# Patient Record
Sex: FEMALE | Race: WHITE | NOT HISPANIC OR LATINO | Employment: UNEMPLOYED | URBAN - METROPOLITAN AREA
[De-identification: names, ages, dates, MRNs, and addresses within clinical notes are randomized per-mention and may not be internally consistent; named-entity substitution may affect disease eponyms.]

---

## 2023-12-07 ENCOUNTER — OFFICE VISIT (OUTPATIENT)
Dept: CARDIOLOGY | Facility: CLINIC | Age: 53
End: 2023-12-07
Payer: COMMERCIAL

## 2023-12-07 VITALS
SYSTOLIC BLOOD PRESSURE: 135 MMHG | BODY MASS INDEX: 18.55 KG/M2 | HEIGHT: 62 IN | DIASTOLIC BLOOD PRESSURE: 73 MMHG | HEART RATE: 75 BPM | WEIGHT: 100.8 LBS

## 2023-12-07 DIAGNOSIS — R94.39 ABNORMAL STRESS ECG WITH TREADMILL: Primary | ICD-10-CM

## 2023-12-07 PROCEDURE — 99203 OFFICE O/P NEW LOW 30 MIN: CPT | Performed by: INTERNAL MEDICINE

## 2023-12-07 PROCEDURE — 1036F TOBACCO NON-USER: CPT | Performed by: INTERNAL MEDICINE

## 2023-12-07 PROCEDURE — 99213 OFFICE O/P EST LOW 20 MIN: CPT | Performed by: INTERNAL MEDICINE

## 2023-12-07 NOTE — PROGRESS NOTES
"Chief Complaint:   Cardiac Stress Test     History of Present Illness     Nelia Levine is a 53 y.o. female presenting with Abnormal GXT.   She had an abnormal GXT performed on 11/22/23 to evaluate an abnormal ECG from July. 2 mm ST-depression no CP.  No cardiac Hx.  She has had no CP or COOK. No Hx HTN or DM.  -140, HDL >100. Non-smoker.       Previous History     Past Medical History:  She has a past medical history of Abnormal stress ECG with treadmill (12/07/2023).    Past Surgical History:  She has a past surgical history that includes Other surgical history (07/09/2020) and Other surgical history (07/09/2020).      Social History:  She reports that she has never smoked. She has never used smokeless tobacco. No history on file for alcohol use and drug use.    Family History:  No family history on file.     Allergies:  Patient has no known allergies.    Outpatient Medications:  No current outpatient medications    Physical Examination   Vitals:  Visit Vitals  /73 (BP Location: Right arm, Patient Position: Sitting, BP Cuff Size: Adult)   Pulse 75   Ht 1.575 m (5' 2\")   Wt 45.7 kg (100 lb 12.8 oz)   BMI 18.44 kg/m²   Smoking Status Never   BSA 1.41 m²    Physical Exam  Vitals reviewed.   Constitutional:       General: She is not in acute distress.     Appearance: Normal appearance.   HENT:      Head: Normocephalic and atraumatic.      Nose: Nose normal.   Eyes:      Conjunctiva/sclera: Conjunctivae normal.   Cardiovascular:      Rate and Rhythm: Normal rate and regular rhythm.      Pulses: Normal pulses.      Heart sounds: No murmur heard.  Pulmonary:      Effort: Pulmonary effort is normal. No respiratory distress.      Breath sounds: Normal breath sounds. No wheezing, rhonchi or rales.   Abdominal:      General: Bowel sounds are normal. There is no distension.      Palpations: Abdomen is soft.      Tenderness: There is no abdominal tenderness.   Musculoskeletal:         General: No " "swelling.      Right lower leg: No edema.      Left lower leg: No edema.   Skin:     General: Skin is warm and dry.      Capillary Refill: Capillary refill takes less than 2 seconds.   Neurological:      General: No focal deficit present.      Mental Status: She is alert.   Psychiatric:         Mood and Affect: Mood normal.              Labs/Imaging/Cardiac Studies     Last Labs:  CBC -  No results found for: \"WBC\", \"HGB\", \"HCT\", \"MCV\", \"PLT\"    CMP -  No results found for: \"CALCIUM\", \"PHOS\", \"PROT\", \"ALBUMIN\", \"AST\", \"ALT\", \"ALKPHOS\", \"BILITOT\"    LIPID PANEL -   No results found for: \"CHOL\", \"HDL\", \"CHHDL\", \"LDL\", \"VLDL\", \"TRIG\", \"NHDL\"    RENAL FUNCTION PANEL -   No results found for: \"GLU\", \"K\", \"CHLOR\", \"PHOS\"    No results found for: \"BNP\", \"HGBA1C\"    ECG:    Echo:  No echocardiogram results found for the past 12 months       Assessment and Recommendations     Assessment/Plan     1. Abnormal stress ECG with treadmill  Suspect this is a false positive stress test.  She has a low pretest probability (no risk factors for CAD other than LDL), baseline abnormal ECG, no symptoms of angina.  Suggest repeat stress test with echo imaging.  - Echocardiogram Stress Test; Future       Edwar Mendes MD    Exclusive of any other services or procedures performed, I, Edwar Mendes MD , spent 30 minutes in duration for this visit today.  This time consisted of chart review, obtaining history, and/or performing the exam as documented above as well as documenting the clinical information for the encounter in the electronic record, discussing treatment options, plans, and/or goals with patient, family, and/or caregiver, refilling medications, updating the electronic record, ordering medicines, lab work, imaging, referrals, and/or procedures as documented above and communicating with other [facility] professionals. I have discussed the results of laboratory, radiology, and cardiology studies with the patient and their " family/caregiver.

## 2024-01-10 ENCOUNTER — TELEPHONE (OUTPATIENT)
Dept: CARDIOLOGY | Facility: CLINIC | Age: 54
End: 2024-01-10
Payer: COMMERCIAL

## 2024-01-10 ENCOUNTER — APPOINTMENT (OUTPATIENT)
Dept: CARDIOLOGY | Facility: CLINIC | Age: 54
End: 2024-01-10
Payer: COMMERCIAL

## 2024-01-16 ENCOUNTER — HOSPITAL ENCOUNTER (OUTPATIENT)
Dept: CARDIOLOGY | Facility: CLINIC | Age: 54
Discharge: HOME | End: 2024-01-16
Payer: COMMERCIAL

## 2024-01-16 VITALS
DIASTOLIC BLOOD PRESSURE: 77 MMHG | SYSTOLIC BLOOD PRESSURE: 157 MMHG | BODY MASS INDEX: 18.4 KG/M2 | WEIGHT: 100 LBS | HEIGHT: 62 IN | HEART RATE: 78 BPM

## 2024-01-16 DIAGNOSIS — R94.39 ABNORMAL STRESS ECG WITH TREADMILL: ICD-10-CM

## 2024-01-16 DIAGNOSIS — R94.31 ABNORMAL ELECTROCARDIOGRAM (ECG) (EKG): ICD-10-CM

## 2024-01-16 PROCEDURE — 93018 CV STRESS TEST I&R ONLY: CPT | Performed by: INTERNAL MEDICINE

## 2024-01-16 PROCEDURE — 93350 STRESS TTE ONLY: CPT | Performed by: INTERNAL MEDICINE

## 2024-01-16 PROCEDURE — 93350 STRESS TTE ONLY: CPT

## 2024-01-16 PROCEDURE — 93016 CV STRESS TEST SUPVJ ONLY: CPT | Performed by: INTERNAL MEDICINE

## 2024-02-01 ENCOUNTER — APPOINTMENT (OUTPATIENT)
Dept: CARDIOLOGY | Facility: CLINIC | Age: 54
End: 2024-02-01
Payer: COMMERCIAL

## 2024-02-01 DIAGNOSIS — E78.2 MIXED HYPERLIPIDEMIA: Primary | ICD-10-CM

## 2024-11-10 ENCOUNTER — HOSPITAL ENCOUNTER (INPATIENT)
Age: 54
LOS: 3 days | Discharge: HOME | DRG: 690 | End: 2024-11-13
Attending: FAMILY MEDICINE | Admitting: INTERNAL MEDICINE
Payer: COMMERCIAL

## 2024-11-10 DIAGNOSIS — N39.0 COMPLICATED UTI (URINARY TRACT INFECTION): Primary | ICD-10-CM

## 2024-11-10 DIAGNOSIS — N12 PYELONEPHRITIS: ICD-10-CM

## 2024-11-10 PROBLEM — D56.9 THALASSEMIA, UNSPECIFIED: Status: ACTIVE | Noted: 2024-11-10

## 2024-11-10 PROBLEM — B96.89 URINARY TRACT INFECTION DUE TO ESBL KLEBSIELLA: Status: RESOLVED | Noted: 2024-11-10 | Resolved: 2024-11-10

## 2024-11-10 PROBLEM — A49.9 INFECTION DUE TO EXTENDED SPECTRUM BETA LACTAMASE (ESBL) PRODUCING BACTERIA: Status: ACTIVE | Noted: 2024-11-10

## 2024-11-10 PROBLEM — Z16.12 INFECTION DUE TO EXTENDED SPECTRUM BETA LACTAMASE (ESBL) PRODUCING BACTERIA: Status: ACTIVE | Noted: 2024-11-10

## 2024-11-10 PROBLEM — B96.89 URINARY TRACT INFECTION DUE TO ESBL KLEBSIELLA: Status: ACTIVE | Noted: 2024-11-10

## 2024-11-10 LAB
BACTERIA URNS QL MICRO: ABNORMAL
BILIRUB UR QL STRIP: NEGATIVE
CASTS #/AREA URNS LPF: ABNORMAL /LPF (ref 0–8)
CLARITY UR: ABNORMAL
COLOR UR: ABNORMAL
CRYSTALS URNS MICRO: ABNORMAL /HPF
EPI CELLS #/AREA URNS HPF: ABNORMAL /HPF (ref 0–5)
GLUCOSE UR STRIP-MCNC: NEGATIVE MG/DL
HGB UR QL STRIP.AUTO: NEGATIVE
KETONES UR STRIP-MCNC: ABNORMAL MG/DL
LEUKOCYTE ESTERASE UR QL STRIP: ABNORMAL
NITRITE UR QL STRIP: NEGATIVE
PH UR STRIP: 5.5 [PH] (ref 5–8)
PROT UR STRIP-MCNC: ABNORMAL MG/DL
RBC #/AREA URNS HPF: ABNORMAL /HPF (ref 0–4)
SP GR UR STRIP: 1.05 (ref 1–1.03)
UROBILINOGEN UR STRIP-ACNC: NORMAL EU/DL (ref 0–1)
WBC #/AREA URNS HPF: ABNORMAL /HPF (ref 0–5)

## 2024-11-10 PROCEDURE — 1200000000 HC SEMI PRIVATE

## 2024-11-10 PROCEDURE — 2580000003 HC RX 258: Performed by: NURSE PRACTITIONER

## 2024-11-10 PROCEDURE — 99222 1ST HOSP IP/OBS MODERATE 55: CPT | Performed by: NURSE PRACTITIONER

## 2024-11-10 PROCEDURE — 6360000002 HC RX W HCPCS: Performed by: NURSE PRACTITIONER

## 2024-11-10 PROCEDURE — 6370000000 HC RX 637 (ALT 250 FOR IP): Performed by: NURSE PRACTITIONER

## 2024-11-10 PROCEDURE — 87086 URINE CULTURE/COLONY COUNT: CPT

## 2024-11-10 PROCEDURE — 81001 URINALYSIS AUTO W/SCOPE: CPT

## 2024-11-10 RX ORDER — ONDANSETRON 2 MG/ML
4 INJECTION INTRAMUSCULAR; INTRAVENOUS EVERY 6 HOURS PRN
Status: DISCONTINUED | OUTPATIENT
Start: 2024-11-10 | End: 2024-11-13 | Stop reason: HOSPADM

## 2024-11-10 RX ORDER — SODIUM CHLORIDE 0.9 % (FLUSH) 0.9 %
5-40 SYRINGE (ML) INJECTION EVERY 12 HOURS SCHEDULED
Status: DISCONTINUED | OUTPATIENT
Start: 2024-11-10 | End: 2024-11-13 | Stop reason: HOSPADM

## 2024-11-10 RX ORDER — ONDANSETRON 4 MG/1
4 TABLET, ORALLY DISINTEGRATING ORAL EVERY 8 HOURS PRN
Status: DISCONTINUED | OUTPATIENT
Start: 2024-11-10 | End: 2024-11-13 | Stop reason: HOSPADM

## 2024-11-10 RX ORDER — SODIUM CHLORIDE 9 MG/ML
INJECTION, SOLUTION INTRAVENOUS CONTINUOUS
Status: DISCONTINUED | OUTPATIENT
Start: 2024-11-10 | End: 2024-11-11

## 2024-11-10 RX ORDER — BISACODYL 10 MG
10 SUPPOSITORY, RECTAL RECTAL DAILY PRN
Status: DISCONTINUED | OUTPATIENT
Start: 2024-11-10 | End: 2024-11-13 | Stop reason: HOSPADM

## 2024-11-10 RX ORDER — ENOXAPARIN SODIUM 100 MG/ML
40 INJECTION SUBCUTANEOUS DAILY
Status: DISCONTINUED | OUTPATIENT
Start: 2024-11-10 | End: 2024-11-11

## 2024-11-10 RX ORDER — MORPHINE SULFATE 2 MG/ML
2 INJECTION, SOLUTION INTRAMUSCULAR; INTRAVENOUS EVERY 4 HOURS PRN
Status: DISCONTINUED | OUTPATIENT
Start: 2024-11-10 | End: 2024-11-13 | Stop reason: HOSPADM

## 2024-11-10 RX ORDER — SODIUM CHLORIDE 9 MG/ML
INJECTION, SOLUTION INTRAVENOUS PRN
Status: DISCONTINUED | OUTPATIENT
Start: 2024-11-10 | End: 2024-11-13 | Stop reason: HOSPADM

## 2024-11-10 RX ORDER — SODIUM CHLORIDE 0.9 % (FLUSH) 0.9 %
5-40 SYRINGE (ML) INJECTION PRN
Status: DISCONTINUED | OUTPATIENT
Start: 2024-11-10 | End: 2024-11-13 | Stop reason: HOSPADM

## 2024-11-10 RX ORDER — KETOROLAC TROMETHAMINE 15 MG/ML
15 INJECTION, SOLUTION INTRAMUSCULAR; INTRAVENOUS EVERY 6 HOURS PRN
Status: DISCONTINUED | OUTPATIENT
Start: 2024-11-10 | End: 2024-11-11

## 2024-11-10 RX ORDER — POTASSIUM CHLORIDE 1500 MG/1
40 TABLET, EXTENDED RELEASE ORAL PRN
Status: DISCONTINUED | OUTPATIENT
Start: 2024-11-10 | End: 2024-11-13 | Stop reason: HOSPADM

## 2024-11-10 RX ORDER — POLYETHYLENE GLYCOL 3350 17 G/17G
17 POWDER, FOR SOLUTION ORAL DAILY PRN
Status: DISCONTINUED | OUTPATIENT
Start: 2024-11-10 | End: 2024-11-13 | Stop reason: HOSPADM

## 2024-11-10 RX ORDER — POTASSIUM CHLORIDE 7.45 MG/ML
10 INJECTION INTRAVENOUS PRN
Status: DISCONTINUED | OUTPATIENT
Start: 2024-11-10 | End: 2024-11-13 | Stop reason: HOSPADM

## 2024-11-10 RX ORDER — ACETAMINOPHEN 325 MG/1
650 TABLET ORAL EVERY 6 HOURS PRN
Status: DISCONTINUED | OUTPATIENT
Start: 2024-11-10 | End: 2024-11-13 | Stop reason: HOSPADM

## 2024-11-10 RX ORDER — ACETAMINOPHEN 650 MG/1
650 SUPPOSITORY RECTAL EVERY 6 HOURS PRN
Status: DISCONTINUED | OUTPATIENT
Start: 2024-11-10 | End: 2024-11-13 | Stop reason: HOSPADM

## 2024-11-10 RX ORDER — LANOLIN ALCOHOL/MO/W.PET/CERES
3 CREAM (GRAM) TOPICAL DAILY PRN
Status: DISCONTINUED | OUTPATIENT
Start: 2024-11-10 | End: 2024-11-13 | Stop reason: HOSPADM

## 2024-11-10 RX ADMIN — SODIUM CHLORIDE: 9 INJECTION, SOLUTION INTRAVENOUS at 22:50

## 2024-11-10 RX ADMIN — ACETAMINOPHEN 650 MG: 325 TABLET ORAL at 22:52

## 2024-11-10 RX ADMIN — MEROPENEM 1000 MG: 1 INJECTION INTRAVENOUS at 22:51

## 2024-11-10 RX ADMIN — SODIUM CHLORIDE, PRESERVATIVE FREE 10 ML: 5 INJECTION INTRAVENOUS at 22:51

## 2024-11-10 ASSESSMENT — PAIN DESCRIPTION - LOCATION
LOCATION: ABDOMEN
LOCATION: ABDOMEN

## 2024-11-10 ASSESSMENT — PAIN DESCRIPTION - DESCRIPTORS: DESCRIPTORS: ACHING

## 2024-11-10 ASSESSMENT — PAIN SCALES - GENERAL
PAINLEVEL_OUTOF10: 3
PAINLEVEL_OUTOF10: 0
PAINLEVEL_OUTOF10: 3

## 2024-11-11 ENCOUNTER — APPOINTMENT (OUTPATIENT)
Dept: CT IMAGING | Age: 54
DRG: 690 | End: 2024-11-11
Attending: FAMILY MEDICINE
Payer: COMMERCIAL

## 2024-11-11 PROBLEM — Z16.12 INFECTION DUE TO ESBL-PRODUCING ESCHERICHIA COLI: Status: ACTIVE | Noted: 2024-11-11

## 2024-11-11 PROBLEM — R65.10 SIRS (SYSTEMIC INFLAMMATORY RESPONSE SYNDROME) (HCC): Status: ACTIVE | Noted: 2024-11-11

## 2024-11-11 PROBLEM — N39.0 COMPLICATED UTI (URINARY TRACT INFECTION): Status: ACTIVE | Noted: 2024-11-11

## 2024-11-11 PROBLEM — A49.8 INFECTION DUE TO ESBL-PRODUCING ESCHERICHIA COLI: Status: ACTIVE | Noted: 2024-11-11

## 2024-11-11 LAB
ABO + RH BLD: NORMAL
ALBUMIN SERPL-MCNC: 3.2 G/DL (ref 3.5–5.2)
ALBUMIN/GLOB SERPL: 1.4 {RATIO} (ref 1–2.5)
ALP SERPL-CCNC: 170 U/L (ref 35–104)
ALT SERPL-CCNC: 42 U/L (ref 10–35)
ANION GAP SERPL CALCULATED.3IONS-SCNC: 11 MMOL/L (ref 9–16)
ARM BAND NUMBER: NORMAL
AST SERPL-CCNC: 42 U/L (ref 10–35)
BASOPHILS # BLD: 0 K/UL (ref 0–0.2)
BASOPHILS NFR BLD: 0 % (ref 0–2)
BILIRUB SERPL-MCNC: 0.9 MG/DL (ref 0–1.2)
BLOOD BANK SAMPLE EXPIRATION: NORMAL
BLOOD GROUP ANTIBODIES SERPL: NEGATIVE
BUN SERPL-MCNC: 7 MG/DL (ref 6–20)
CALCIUM SERPL-MCNC: 8.1 MG/DL (ref 8.6–10.4)
CHLORIDE SERPL-SCNC: 102 MMOL/L (ref 98–107)
CO2 SERPL-SCNC: 22 MMOL/L (ref 20–31)
CREAT SERPL-MCNC: 0.7 MG/DL (ref 0.6–0.9)
EOSINOPHIL # BLD: 0 K/UL (ref 0–0.44)
EOSINOPHILS RELATIVE PERCENT: 0 % (ref 1–4)
ERYTHROCYTE [DISTWIDTH] IN BLOOD BY AUTOMATED COUNT: 14.6 % (ref 11.8–14.4)
GFR, ESTIMATED: >90 ML/MIN/1.73M2
GLUCOSE SERPL-MCNC: 103 MG/DL (ref 74–99)
HCT VFR BLD AUTO: 34 % (ref 36.3–47.1)
HGB BLD-MCNC: 10.8 G/DL (ref 11.9–15.1)
IMM GRANULOCYTES # BLD AUTO: 0.13 K/UL (ref 0–0.3)
IMM GRANULOCYTES NFR BLD: 1 %
IMM RETICS NFR: 11.7 % (ref 2.7–18.3)
LACTIC ACID, SEPSIS WHOLE BLOOD: 0.7 MMOL/L (ref 0.5–1.9)
LACTIC ACID, SEPSIS WHOLE BLOOD: 0.9 MMOL/L (ref 0.5–1.9)
LACTIC ACID, WHOLE BLOOD: 1.3 MMOL/L (ref 0.7–2.1)
LDH SERPL-CCNC: 156 U/L (ref 135–214)
LYMPHOCYTES NFR BLD: 0.67 K/UL (ref 1.1–3.7)
LYMPHOCYTES RELATIVE PERCENT: 5 % (ref 24–43)
MAGNESIUM SERPL-MCNC: 1.9 MG/DL (ref 1.6–2.6)
MCH RBC QN AUTO: 22.5 PG (ref 25.2–33.5)
MCHC RBC AUTO-ENTMCNC: 31.8 G/DL (ref 28.4–34.8)
MCV RBC AUTO: 70.8 FL (ref 82.6–102.9)
MICROORGANISM SPEC CULT: NO GROWTH
MONOCYTES NFR BLD: 0.8 K/UL (ref 0.1–1.2)
MONOCYTES NFR BLD: 6 % (ref 3–12)
MORPHOLOGY: ABNORMAL
MORPHOLOGY: ABNORMAL
NEUTROPHILS NFR BLD: 88 % (ref 36–65)
NEUTS SEG NFR BLD: 11.8 K/UL (ref 1.5–8.1)
NRBC BLD-RTO: 0 PER 100 WBC
PLATELET # BLD AUTO: 240 K/UL (ref 138–453)
PMV BLD AUTO: 10.3 FL (ref 8.1–13.5)
POTASSIUM SERPL-SCNC: 3.7 MMOL/L (ref 3.7–5.3)
PROT SERPL-MCNC: 5.5 G/DL (ref 6.6–8.7)
RBC # BLD AUTO: 4.8 M/UL (ref 3.95–5.11)
RETIC HEMOGLOBIN: 20.8 PG (ref 28.2–35.7)
RETICS # AUTO: 0.03 M/UL (ref 0.03–0.08)
RETICS/RBC NFR AUTO: 0.7 % (ref 0.5–1.9)
SERVICE CMNT-IMP: NORMAL
SODIUM SERPL-SCNC: 135 MMOL/L (ref 136–145)
SPECIMEN DESCRIPTION: NORMAL
WBC OTHER # BLD: 13.4 K/UL (ref 3.5–11.3)

## 2024-11-11 PROCEDURE — 74176 CT ABD & PELVIS W/O CONTRAST: CPT

## 2024-11-11 PROCEDURE — 86850 RBC ANTIBODY SCREEN: CPT

## 2024-11-11 PROCEDURE — 2580000003 HC RX 258: Performed by: NURSE PRACTITIONER

## 2024-11-11 PROCEDURE — 2580000003 HC RX 258: Performed by: INTERNAL MEDICINE

## 2024-11-11 PROCEDURE — 86901 BLOOD TYPING SEROLOGIC RH(D): CPT

## 2024-11-11 PROCEDURE — 6360000002 HC RX W HCPCS: Performed by: INTERNAL MEDICINE

## 2024-11-11 PROCEDURE — 83605 ASSAY OF LACTIC ACID: CPT

## 2024-11-11 PROCEDURE — 87040 BLOOD CULTURE FOR BACTERIA: CPT

## 2024-11-11 PROCEDURE — 83615 LACTATE (LD) (LDH) ENZYME: CPT

## 2024-11-11 PROCEDURE — 99233 SBSQ HOSP IP/OBS HIGH 50: CPT | Performed by: INTERNAL MEDICINE

## 2024-11-11 PROCEDURE — 6370000000 HC RX 637 (ALT 250 FOR IP): Performed by: NURSE PRACTITIONER

## 2024-11-11 PROCEDURE — 80053 COMPREHEN METABOLIC PANEL: CPT

## 2024-11-11 PROCEDURE — 36415 COLL VENOUS BLD VENIPUNCTURE: CPT

## 2024-11-11 PROCEDURE — 6360000002 HC RX W HCPCS: Performed by: NURSE PRACTITIONER

## 2024-11-11 PROCEDURE — 83735 ASSAY OF MAGNESIUM: CPT

## 2024-11-11 PROCEDURE — 85025 COMPLETE CBC W/AUTO DIFF WBC: CPT

## 2024-11-11 PROCEDURE — 1200000000 HC SEMI PRIVATE

## 2024-11-11 PROCEDURE — 99232 SBSQ HOSP IP/OBS MODERATE 35: CPT | Performed by: INTERNAL MEDICINE

## 2024-11-11 PROCEDURE — 85045 AUTOMATED RETICULOCYTE COUNT: CPT

## 2024-11-11 PROCEDURE — 82977 ASSAY OF GGT: CPT

## 2024-11-11 PROCEDURE — 86900 BLOOD TYPING SEROLOGIC ABO: CPT

## 2024-11-11 PROCEDURE — 51798 US URINE CAPACITY MEASURE: CPT

## 2024-11-11 RX ORDER — GLUCAGON 1 MG/ML
1 KIT INJECTION PRN
Status: DISCONTINUED | OUTPATIENT
Start: 2024-11-11 | End: 2024-11-13 | Stop reason: HOSPADM

## 2024-11-11 RX ORDER — DEXTROSE MONOHYDRATE 100 MG/ML
INJECTION, SOLUTION INTRAVENOUS CONTINUOUS PRN
Status: DISCONTINUED | OUTPATIENT
Start: 2024-11-11 | End: 2024-11-13 | Stop reason: HOSPADM

## 2024-11-11 RX ORDER — KETOROLAC TROMETHAMINE 15 MG/ML
15 INJECTION, SOLUTION INTRAMUSCULAR; INTRAVENOUS 2 TIMES DAILY PRN
Status: DISPENSED | OUTPATIENT
Start: 2024-11-11 | End: 2024-11-12

## 2024-11-11 RX ADMIN — SODIUM CHLORIDE, PRESERVATIVE FREE 10 ML: 5 INJECTION INTRAVENOUS at 20:26

## 2024-11-11 RX ADMIN — ACETAMINOPHEN 650 MG: 325 TABLET ORAL at 17:28

## 2024-11-11 RX ADMIN — SODIUM CHLORIDE, PRESERVATIVE FREE 10 ML: 5 INJECTION INTRAVENOUS at 08:47

## 2024-11-11 RX ADMIN — KETOROLAC TROMETHAMINE 15 MG: 15 INJECTION, SOLUTION INTRAMUSCULAR; INTRAVENOUS at 08:46

## 2024-11-11 RX ADMIN — SODIUM CHLORIDE, PRESERVATIVE FREE 40 MG: 5 INJECTION INTRAVENOUS at 09:05

## 2024-11-11 RX ADMIN — MEROPENEM 1000 MG: 1 INJECTION INTRAVENOUS at 22:35

## 2024-11-11 RX ADMIN — MEROPENEM 1000 MG: 1 INJECTION INTRAVENOUS at 06:18

## 2024-11-11 RX ADMIN — MEROPENEM 1000 MG: 1 INJECTION INTRAVENOUS at 14:49

## 2024-11-11 ASSESSMENT — PAIN SCALES - GENERAL
PAINLEVEL_OUTOF10: 8
PAINLEVEL_OUTOF10: 3
PAINLEVEL_OUTOF10: 6
PAINLEVEL_OUTOF10: 3

## 2024-11-11 ASSESSMENT — PAIN DESCRIPTION - LOCATION
LOCATION: FLANK

## 2024-11-11 ASSESSMENT — PAIN DESCRIPTION - DESCRIPTORS
DESCRIPTORS: DISCOMFORT
DESCRIPTORS: ACHING
DESCRIPTORS: ACHING

## 2024-11-11 ASSESSMENT — PAIN DESCRIPTION - ORIENTATION
ORIENTATION: RIGHT

## 2024-11-11 ASSESSMENT — PAIN SCALES - WONG BAKER: WONGBAKER_NUMERICALRESPONSE: NO HURT

## 2024-11-11 NOTE — CARE COORDINATION
Case Management Assessment  Initial Evaluation    Date/Time of Evaluation: 11/11/2024 9:02 AM  Assessment Completed by: Tim Venegas RN    If patient is discharged prior to next notation, then this note serves as note for discharge by case management.    Patient Name: Evelyne Joe                   YOB: 1970  Diagnosis: Pyelonephritis [N12]                   Date / Time: 11/10/2024  7:43 PM    Patient Admission Status: Inpatient   Readmission Risk (Low < 19, Mod (19-27), High > 27): Readmission Risk Score: 7    Current PCP: Jamal Calderon MD  PCP verified by CM? (P) Yes    Chart Reviewed: Yes      History Provided by: (P) Patient  Patient Orientation: (P) Alert and Oriented    Patient Cognition: (P) Alert    Hospitalization in the last 30 days (Readmission):  No    If yes, Readmission Assessment in  Navigator will be completed.    Advance Directives:      Code Status: Full Code   Patient's Primary Decision Maker is:        Discharge Planning:    Patient lives with: (P) Spouse/Significant Other Type of Home: (P) House  Primary Care Giver: (P) Self  Patient Support Systems include: (P) Spouse/Significant Other   Current Financial resources:    Current community resources:    Current services prior to admission: (P) None            Current DME:              Type of Home Care services:  (P) None    ADLS  Prior functional level: (P) Independent in ADLs/IADLs  Current functional level: (P) Independent in ADLs/IADLs    PT AM-PAC:   /24  OT AM-PAC:   /24    Family can provide assistance at DC: (P) Yes  Would you like Case Management to discuss the discharge plan with any other family members/significant others, and if so, who? (P) Yes (-Bill)  Plans to Return to Present Housing: (P) Yes  Other Identified Issues/Barriers to RETURNING to current housing: None Noted  Potential Assistance needed at discharge: (P) N/A            Potential DME:    Patient expects to discharge to: (P) House  Plan for

## 2024-11-11 NOTE — PLAN OF CARE
Problem: Safety - Adult  Goal: Free from fall injury  11/11/2024 0301 by Jaswinder Cooney, RN  Outcome: Progressing  11/10/2024 1941 by Jaswinder Cooney, RN  Outcome: Progressing     Problem: Pain  Goal: Verbalizes/displays adequate comfort level or baseline comfort level  Outcome: Progressing

## 2024-11-11 NOTE — CARE COORDINATION
Transitional Planning:    Faxed Face Sheet to [facility] and spoke with Catie. Patient will most likely need IV antibiotics once Discharged. Has Home Care list.

## 2024-11-11 NOTE — PLAN OF CARE
Problem: Safety - Adult  Goal: Free from fall injury  11/11/2024 1027 by Timo Valencia, RN  Outcome: Progressing  11/11/2024 0301 by Jaswinder Cooney RN  Outcome: Progressing     Problem: Pain  Goal: Verbalizes/displays adequate comfort level or baseline comfort level  11/11/2024 1027 by Timo Valencia, RN  Outcome: Progressing  11/11/2024 0301 by Jaswinder Cooney RN  Outcome: Progressing

## 2024-11-11 NOTE — CONSULTS
Urology Consult Note      Patient:  Evelyne Joe  MRN: [id number]  YOB: 1970    ATTENDING: Dr. Ferraro    CHIEF COMPLAINT: Right pyelonephritis with mild right hydro    HISTORY OF PRESENT ILLNESS:   The patient is a 54 y.o. female who presents with with ESBL E. coli.  She initially had suprapubic pain several days ago and presented to an outside urgent care.  She was given several days of antibiotics as well as a culture was taken.  Patient had unfortunately worsening abdominal pain as well as a high fever and presented to an outside hospital.  Underwent a CT scan which showed some mild right hydronephrosis with some thickening of the right ureter.  Patient was also found to have ESBL E. coli and was transferred to [facility] for infectious disease management    Urology was consulted for mild right hydronephrosis.  Patient does note some right flank pain however this is significant proved with only 2 days of antibiotics.  Patient does have a significant urological history she said passed a stone about 30 years ago.  She did require lithotripsy.  Patient unfortunate to become septic after the procedure and required a stent to be inserted.    This morning patient resting comfortably.  Denies any nausea vomiting fevers or chills.  Is urinating no difficulty.  CT scan did show some mild thickening of the right ureter as well as significantly enlarged bladder.  Will get several postvoid residuals to ensure patient is emptying her bladder completely.    Patient's old records, notes and chart reviewed and summarized above.    Past Medical History:    Past Medical History:   Diagnosis Date    Kidney stone on right side     ~30 years prior requiring lithotripsy    Thalassemia        Past Surgical History:    Past Surgical History:   Procedure Laterality Date     SECTION      LITHOTRIPSY Right     ~30 years ago       Medications:    Scheduled Meds:   sodium chloride flush  5-40 mL 
42*     No results for input(s): \"RPR\" in the last 72 hours.  No results for input(s): \"HIV\" in the last 72 hours.  No results for input(s): \"BC\" in the last 72 hours.  Lab Results   Component Value Date/Time    BACTERIA None 11/10/2024 08:54 PM    RBC 4.80 11/11/2024 09:39 AM    WBC 13.4 11/11/2024 09:39 AM    TURBIDITY Cloudy 11/10/2024 08:54 PM     Lab Results   Component Value Date/Time    CREATININE 0.7 11/11/2024 02:45 AM    GLUCOSE 103 11/11/2024 02:45 AM         Cultures:  Urine:  11-8-24: E coli ESBL + from [facility]  11/10: pending  Blood:  11/11: pending x2  Sputum :    Wound:    MRSA Nares:      Imaging:            Review of Systems:     Pertinent review of symptoms listed in Initial Evaluation and daily interval evaluations sections      Physical Examination :   Patient Vitals for the past 8 hrs:   BP Temp Temp src Pulse Resp SpO2   11/11/24 0808 134/72 98.5 °F (36.9 °C) Oral 92 18 97 %     General Appearance: Awake, alert, and in no apparent distress  Head:  Normocephalic, no trauma  Eyes: Pupils equal, round, reactive to light and accommodation; extraocular movements intact; sclera anicteric; conjunctivae pink. No embolic phenomena.  ENT: Oropharynx clear, without erythema, exudate, or thrush. No tenderness of sinuses. Mouth/throat: mucosa pink and moist. No lesions. Dentition in good repair.  Neck:Supple, without lymphadenopathy. Thyroid normal, No bruits.  Pulmonary/Chest: Clear to auscultation, without wheezes, rales, or rhonchi.  No dullness to percussion.   Cardiovascular: Regular rate and rhythm without murmurs, rubs, or gallops.   Abdomen: Soft, non tender to all quadrants, no CVA tenderness. Bowel sounds normal. No organomegaly  All four Extremities: No cyanosis, clubbing, edema, or effusions.  Neurologic: No gross sensory or motor deficits.  Skin: Warm and dry with good turgor.No signs of peripheral arterial or venous insufficiency. No ulcerations. No open wounds.      I have personally

## 2024-11-11 NOTE — CARE COORDINATION
Post Acute Facility/Agency List     Provided patient with the following list, the list includes the overall star ratings obtained from CMS per the Medicare Web site (www.Medicare.gov):     [] Long Term Acute Care Facilities  [] Acute Inpatient Rehabilitation Facilities  [] Skilled Nursing Facilities  [] Hospice Facilities  [x] Home Care    Provided verbal instructions on how to utilize the QR Code to obtain additional detailed star ratings from www.Medicare.gov     offered to print and provide the detailed list:    []Accepted   [x]Declined    The following printed detailed lists were provided:

## 2024-11-11 NOTE — H&P
[facility]  Office: [phone]  Keith Hollingsworth DO, Anoop Granados DO, Howard Gann DO, Khanh Uribe DO, Gloria Candelario MD, Bethany Malave MD, Paula León MD, Lexi Marino MD,  William Vega MD, Claudia Martins MD, Khloe Barajas MD,  Una Resendiz DO, Lizzeth Krishnamurthy MD, Abundio Francois MD, Cain Hollingsworth DO, Kathi Paez MD,  Darren Green DO, Mirella Banda MD, Amparo Recinos MD, Emma Vu MD, Irais Lucio MD,  Fab Colmenares MD, Demetrio Odonnell MD, Mahogany Morataya MD, Jennifer Strickland MD, Baldomero Crowder MD, Sidney Smith MD, Eddie Duncan DO, Wilmer Valentine MD, Shirley Waterhouse, CNP,  Yolanda Tejada, CNP, Eddie Lepe, CNP,  Loli Sparks, CHRISTI, Michaela Spencer, CNP, Deanna Morales, CNP, Tawny León, CNP, Sherley Gardner, CNP, Mariia Iverson PA-C, CHRISTIANE HernándezC, Sultana Valenzuela, CNP, Day Glover, CNP,  Felisa Rodrigues, CNP, Aimee Chaves, CNP,  Carolina Steele, CNP, Marielena Castellanos, CNP         [facility]   IN-PATIENT SERVICE   [facility]    HISTORY AND PHYSICAL EXAMINATION            Date:   11/10/2024  Patient name:  Evelyne Joe  Date of admission:  11/10/2024  7:43 PM  MRN:   [id number]  Account:  [id number]  YOB: 1970  PCP:    Jamal Calderon MD  Room:   0345/0345-02  Code Status:    Full Code    Chief Complaint:     10 days of dysuria, lower abdominal discomfort, fever and decreased appetite    History Obtained From:     Patient and medical records     History of Present Illness:     Evelyne Joe is a 54 y.o. [place]  female pmh Thalemessia- asymptomatic  (no prior blood transfusions) and right kidney stone ~30 years s/p lithotripsy who presented to [facility] ER with c/o 10 days of burning with urination, right sided lower abdominal pain, fever 102, chills, right lower back discomfort and suprpubic discomfort along with urinary urgency and frequency. She presented to an Urgent Care/ [facility] and was

## 2024-11-12 LAB
ANION GAP SERPL CALCULATED.3IONS-SCNC: 12 MMOL/L (ref 9–16)
BASOPHILS # BLD: 0.04 K/UL (ref 0–0.2)
BASOPHILS NFR BLD: 0 % (ref 0–2)
BUN SERPL-MCNC: 9 MG/DL (ref 6–20)
CALCIUM SERPL-MCNC: 8.4 MG/DL (ref 8.6–10.4)
CHLORIDE SERPL-SCNC: 107 MMOL/L (ref 98–107)
CO2 SERPL-SCNC: 21 MMOL/L (ref 20–31)
CREAT SERPL-MCNC: 0.6 MG/DL (ref 0.6–0.9)
EOSINOPHIL # BLD: 0.07 K/UL (ref 0–0.44)
EOSINOPHILS RELATIVE PERCENT: 1 % (ref 1–4)
ERYTHROCYTE [DISTWIDTH] IN BLOOD BY AUTOMATED COUNT: 14.9 % (ref 11.8–14.4)
GFR, ESTIMATED: >90 ML/MIN/1.73M2
GGT SERPL-CCNC: 145 U/L (ref 5–36)
GGT SERPL-CCNC: 147 U/L (ref 5–36)
GLUCOSE BLD-MCNC: 106 MG/DL (ref 65–105)
GLUCOSE BLD-MCNC: 117 MG/DL (ref 65–105)
GLUCOSE BLD-MCNC: 148 MG/DL (ref 65–105)
GLUCOSE SERPL-MCNC: 126 MG/DL (ref 74–99)
HCT VFR BLD AUTO: 37 % (ref 36.3–47.1)
HGB BLD-MCNC: 11.3 G/DL (ref 11.9–15.1)
IMM GRANULOCYTES # BLD AUTO: 0.09 K/UL (ref 0–0.3)
IMM GRANULOCYTES NFR BLD: 1 %
LYMPHOCYTES NFR BLD: 1.14 K/UL (ref 1.1–3.7)
LYMPHOCYTES RELATIVE PERCENT: 11 % (ref 24–43)
MCH RBC QN AUTO: 22.6 PG (ref 25.2–33.5)
MCHC RBC AUTO-ENTMCNC: 30.5 G/DL (ref 28.4–34.8)
MCV RBC AUTO: 73.9 FL (ref 82.6–102.9)
MONOCYTES NFR BLD: 0.88 K/UL (ref 0.1–1.2)
MONOCYTES NFR BLD: 8 % (ref 3–12)
NEUTROPHILS NFR BLD: 79 % (ref 36–65)
NEUTS SEG NFR BLD: 8.33 K/UL (ref 1.5–8.1)
NRBC BLD-RTO: 0 PER 100 WBC
PATH REV BLD -IMP: NORMAL
PLATELET # BLD AUTO: 281 K/UL (ref 138–453)
PMV BLD AUTO: 10.2 FL (ref 8.1–13.5)
POTASSIUM SERPL-SCNC: 3.6 MMOL/L (ref 3.7–5.3)
RBC # BLD AUTO: 5.01 M/UL (ref 3.95–5.11)
RBC # BLD: ABNORMAL 10*6/UL
SODIUM SERPL-SCNC: 140 MMOL/L (ref 136–145)
SURGICAL PATHOLOGY REPORT: NORMAL
WBC OTHER # BLD: 10.6 K/UL (ref 3.5–11.3)

## 2024-11-12 PROCEDURE — 80048 BASIC METABOLIC PNL TOTAL CA: CPT

## 2024-11-12 PROCEDURE — 6360000002 HC RX W HCPCS: Performed by: INTERNAL MEDICINE

## 2024-11-12 PROCEDURE — 36415 COLL VENOUS BLD VENIPUNCTURE: CPT

## 2024-11-12 PROCEDURE — 1200000000 HC SEMI PRIVATE

## 2024-11-12 PROCEDURE — 82947 ASSAY GLUCOSE BLOOD QUANT: CPT

## 2024-11-12 PROCEDURE — 99232 SBSQ HOSP IP/OBS MODERATE 35: CPT | Performed by: STUDENT IN AN ORGANIZED HEALTH CARE EDUCATION/TRAINING PROGRAM

## 2024-11-12 PROCEDURE — 6360000002 HC RX W HCPCS: Performed by: NURSE PRACTITIONER

## 2024-11-12 PROCEDURE — 6370000000 HC RX 637 (ALT 250 FOR IP): Performed by: NURSE PRACTITIONER

## 2024-11-12 PROCEDURE — 2580000003 HC RX 258: Performed by: NURSE PRACTITIONER

## 2024-11-12 PROCEDURE — 99232 SBSQ HOSP IP/OBS MODERATE 35: CPT | Performed by: INTERNAL MEDICINE

## 2024-11-12 PROCEDURE — 85025 COMPLETE CBC W/AUTO DIFF WBC: CPT

## 2024-11-12 RX ADMIN — ACETAMINOPHEN 650 MG: 325 TABLET ORAL at 21:01

## 2024-11-12 RX ADMIN — MEROPENEM 1000 MG: 1 INJECTION INTRAVENOUS at 14:58

## 2024-11-12 RX ADMIN — MORPHINE SULFATE 2 MG: 2 INJECTION, SOLUTION INTRAMUSCULAR; INTRAVENOUS at 18:19

## 2024-11-12 RX ADMIN — SODIUM CHLORIDE, PRESERVATIVE FREE 10 ML: 5 INJECTION INTRAVENOUS at 02:41

## 2024-11-12 RX ADMIN — KETOROLAC TROMETHAMINE 15 MG: 15 INJECTION, SOLUTION INTRAMUSCULAR; INTRAVENOUS at 02:41

## 2024-11-12 RX ADMIN — SODIUM CHLORIDE, PRESERVATIVE FREE 10 ML: 5 INJECTION INTRAVENOUS at 20:16

## 2024-11-12 RX ADMIN — MEROPENEM 1000 MG: 1 INJECTION INTRAVENOUS at 06:41

## 2024-11-12 ASSESSMENT — PAIN DESCRIPTION - LOCATION
LOCATION: ABDOMEN
LOCATION: FLANK
LOCATION: FLANK

## 2024-11-12 ASSESSMENT — ENCOUNTER SYMPTOMS
BLOOD IN STOOL: 0
TROUBLE SWALLOWING: 0
SORE THROAT: 0
CONSTIPATION: 0
BACK PAIN: 0
SHORTNESS OF BREATH: 0
COUGH: 0
VOMITING: 0
WHEEZING: 0
NAUSEA: 0
COLOR CHANGE: 0
ABDOMINAL PAIN: 0
DIARRHEA: 0

## 2024-11-12 ASSESSMENT — PAIN SCALES - GENERAL
PAINLEVEL_OUTOF10: 8
PAINLEVEL_OUTOF10: 3
PAINLEVEL_OUTOF10: 6
PAINLEVEL_OUTOF10: 0
PAINLEVEL_OUTOF10: 3
PAINLEVEL_OUTOF10: 0

## 2024-11-12 ASSESSMENT — PAIN DESCRIPTION - PAIN TYPE: TYPE: ACUTE PAIN

## 2024-11-12 ASSESSMENT — PAIN DESCRIPTION - DESCRIPTORS
DESCRIPTORS: ACHING
DESCRIPTORS: ACHING;DISCOMFORT

## 2024-11-12 ASSESSMENT — PAIN DESCRIPTION - ONSET: ONSET: ON-GOING

## 2024-11-12 ASSESSMENT — PAIN - FUNCTIONAL ASSESSMENT: PAIN_FUNCTIONAL_ASSESSMENT: ACTIVITIES ARE NOT PREVENTED

## 2024-11-12 ASSESSMENT — PAIN DESCRIPTION - FREQUENCY: FREQUENCY: CONTINUOUS

## 2024-11-12 ASSESSMENT — PAIN DESCRIPTION - ORIENTATION
ORIENTATION: RIGHT
ORIENTATION: RIGHT;LOWER

## 2024-11-12 NOTE — PLAN OF CARE
Problem: Safety - Adult  Goal: Free from fall injury  11/12/2024 1847 by Mariana Briceño, RN  Outcome: Progressing  11/12/2024 0609 by Pranav Zabala RN  Outcome: Progressing     Problem: Pain  Goal: Verbalizes/displays adequate comfort level or baseline comfort level  11/12/2024 1847 by Mariana Briceño, RN  Outcome: Progressing  11/12/2024 0609 by Pranav Zabala, RN  Outcome: Progressing     Problem: Genitourinary - Adult  Goal: Absence of urinary retention  11/12/2024 1847 by Mariana Briceño RN  Outcome: Progressing  11/12/2024 0609 by Pranav Zabala, RN  Outcome: Progressing

## 2024-11-12 NOTE — PLAN OF CARE
Problem: Safety - Adult  Goal: Free from fall injury  Outcome: Progressing     Problem: Pain  Goal: Verbalizes/displays adequate comfort level or baseline comfort level  Outcome: Progressing     Problem: Genitourinary - Adult  Goal: Absence of urinary retention  Outcome: Progressing

## 2024-11-13 ENCOUNTER — APPOINTMENT (OUTPATIENT)
Dept: ULTRASOUND IMAGING | Age: 54
DRG: 690 | End: 2024-11-13
Attending: FAMILY MEDICINE
Payer: COMMERCIAL

## 2024-11-13 VITALS
HEIGHT: 62 IN | OXYGEN SATURATION: 100 % | DIASTOLIC BLOOD PRESSURE: 84 MMHG | BODY MASS INDEX: 18.4 KG/M2 | RESPIRATION RATE: 17 BRPM | TEMPERATURE: 97.8 F | SYSTOLIC BLOOD PRESSURE: 132 MMHG | WEIGHT: 100 LBS | HEART RATE: 74 BPM

## 2024-11-13 LAB
ANION GAP SERPL CALCULATED.3IONS-SCNC: 8 MMOL/L (ref 9–16)
BASOPHILS # BLD: 0.06 K/UL (ref 0–0.2)
BASOPHILS NFR BLD: 1 % (ref 0–2)
BUN SERPL-MCNC: 8 MG/DL (ref 6–20)
CALCIUM SERPL-MCNC: 9.1 MG/DL (ref 8.6–10.4)
CHLORIDE SERPL-SCNC: 107 MMOL/L (ref 98–107)
CO2 SERPL-SCNC: 27 MMOL/L (ref 20–31)
CREAT SERPL-MCNC: 0.6 MG/DL (ref 0.6–0.9)
EOSINOPHIL # BLD: 0.15 K/UL (ref 0–0.44)
EOSINOPHILS RELATIVE PERCENT: 2 % (ref 1–4)
ERYTHROCYTE [DISTWIDTH] IN BLOOD BY AUTOMATED COUNT: 14.7 % (ref 11.8–14.4)
GFR, ESTIMATED: >90 ML/MIN/1.73M2
GLUCOSE SERPL-MCNC: 103 MG/DL (ref 74–99)
HCT VFR BLD AUTO: 35.6 % (ref 36.3–47.1)
HGB BLD-MCNC: 11 G/DL (ref 11.9–15.1)
IMM GRANULOCYTES # BLD AUTO: 0.11 K/UL (ref 0–0.3)
IMM GRANULOCYTES NFR BLD: 1 %
LYMPHOCYTES NFR BLD: 1.64 K/UL (ref 1.1–3.7)
LYMPHOCYTES RELATIVE PERCENT: 18 % (ref 24–43)
MCH RBC QN AUTO: 22.1 PG (ref 25.2–33.5)
MCHC RBC AUTO-ENTMCNC: 30.9 G/DL (ref 28.4–34.8)
MCV RBC AUTO: 71.5 FL (ref 82.6–102.9)
MONOCYTES NFR BLD: 0.84 K/UL (ref 0.1–1.2)
MONOCYTES NFR BLD: 9 % (ref 3–12)
NEUTROPHILS NFR BLD: 69 % (ref 36–65)
NEUTS SEG NFR BLD: 6.37 K/UL (ref 1.5–8.1)
NRBC BLD-RTO: 0 PER 100 WBC
PLATELET # BLD AUTO: 313 K/UL (ref 138–453)
PMV BLD AUTO: 10 FL (ref 8.1–13.5)
POTASSIUM SERPL-SCNC: 4.4 MMOL/L (ref 3.7–5.3)
RBC # BLD AUTO: 4.98 M/UL (ref 3.95–5.11)
RBC # BLD: ABNORMAL 10*6/UL
SODIUM SERPL-SCNC: 142 MMOL/L (ref 136–145)
WBC OTHER # BLD: 9.2 K/UL (ref 3.5–11.3)

## 2024-11-13 PROCEDURE — 76770 US EXAM ABDO BACK WALL COMP: CPT

## 2024-11-13 PROCEDURE — 2580000003 HC RX 258: Performed by: NURSE PRACTITIONER

## 2024-11-13 PROCEDURE — 80048 BASIC METABOLIC PNL TOTAL CA: CPT

## 2024-11-13 PROCEDURE — 6360000002 HC RX W HCPCS: Performed by: NURSE PRACTITIONER

## 2024-11-13 PROCEDURE — 99232 SBSQ HOSP IP/OBS MODERATE 35: CPT | Performed by: INTERNAL MEDICINE

## 2024-11-13 PROCEDURE — 99239 HOSP IP/OBS DSCHRG MGMT >30: CPT | Performed by: STUDENT IN AN ORGANIZED HEALTH CARE EDUCATION/TRAINING PROGRAM

## 2024-11-13 PROCEDURE — 85025 COMPLETE CBC W/AUTO DIFF WBC: CPT

## 2024-11-13 PROCEDURE — 36415 COLL VENOUS BLD VENIPUNCTURE: CPT

## 2024-11-13 RX ORDER — OXYCODONE HYDROCHLORIDE 5 MG/1
5 TABLET ORAL EVERY 6 HOURS PRN
Qty: 10 TABLET | Refills: 0 | Status: SHIPPED | OUTPATIENT
Start: 2024-11-13 | End: 2024-11-16

## 2024-11-13 RX ORDER — CIPROFLOXACIN 500 MG/1
500 TABLET, FILM COATED ORAL EVERY 12 HOURS SCHEDULED
Status: DISCONTINUED | OUTPATIENT
Start: 2024-11-13 | End: 2024-11-13 | Stop reason: HOSPADM

## 2024-11-13 RX ORDER — CIPROFLOXACIN 500 MG/1
500 TABLET, FILM COATED ORAL 2 TIMES DAILY
Qty: 52 TABLET | Refills: 0 | Status: SHIPPED | OUTPATIENT
Start: 2024-11-13 | End: 2024-12-09

## 2024-11-13 RX ORDER — ACETAMINOPHEN 325 MG/1
650 TABLET ORAL EVERY 6 HOURS PRN
Qty: 56 TABLET | Refills: 0 | Status: SHIPPED | OUTPATIENT
Start: 2024-11-13 | End: 2024-11-20

## 2024-11-13 RX ADMIN — MEROPENEM 1000 MG: 1 INJECTION INTRAVENOUS at 00:01

## 2024-11-13 RX ADMIN — MEROPENEM 1000 MG: 1 INJECTION INTRAVENOUS at 07:56

## 2024-11-13 RX ADMIN — SODIUM CHLORIDE, PRESERVATIVE FREE 10 ML: 5 INJECTION INTRAVENOUS at 07:57

## 2024-11-13 NOTE — PLAN OF CARE
Problem: Pain  Goal: Verbalizes/displays adequate comfort level or baseline comfort level  11/13/2024 0319 by Julissa Mesa, SHELLY  Outcome: Progressing  11/12/2024 1847 by Mariana Briceño, RN  Outcome: Progressing     Problem: Genitourinary - Adult  Goal: Absence of urinary retention  11/13/2024 0319 by Julissa Mesa, SHELLY  Outcome: Progressing  11/12/2024 1847 by Mariana Briceño, RN  Outcome: Progressing     Problem: Safety - Adult  Goal: Free from fall injury  11/13/2024 0319 by Julissa Mesa RN  Outcome: Progressing  11/12/2024 1847 by Mariana Briceño, RN  Outcome: Progressing

## 2024-11-13 NOTE — CARE COORDINATION
Discharge Report    [facility]  Clinical Case Management Department  Written by: Marika Kevin RN    Patient Name: Evelyne DawsonShilo  Attending Provider: Jennifer Strickland*  Admit Date: 11/10/2024  7:43 PM  MRN: [id number]  Account: [id number]                     : 1970  Discharge Date: 2024      Disposition: home    Marika Kevin RN

## 2024-11-13 NOTE — DISCHARGE SUMMARY
[facility] - [place], [place] - [address] - P [phone] - F [phone] [address] [facility] [address]      Phone: [phone]   acetaminophen 325 MG tablet  ciprofloxacin 500 MG tablet  oxyCODONE 5 MG immediate release tablet         No discharge procedures on file.    Time Spent on discharge is  31 mins in patient examination, evaluation, counseling as well as medication reconciliation, prescriptions for required medications, discharge plan and follow up.    Electronically signed by   Jennifer Strickland MD  11/13/2024  10:21 AM      Thank you Jamal Key MD for the opportunity to be involved in this patient's care.

## 2024-11-13 NOTE — DISCHARGE INSTRUCTIONS
Please continue to take your medication as prescribed and follow-up with your primary doctor within 1 week of discharge

## 2024-11-13 NOTE — PLAN OF CARE
Problem: Safety - Adult  Goal: Free from fall injury  11/13/2024 0823 by Eddie Bajwa, RN  Outcome: Progressing  11/13/2024 0319 by Julissa Mesa RN  Outcome: Progressing  11/12/2024 1847 by Mariana Briceño RN  Outcome: Progressing     Problem: Pain  Goal: Verbalizes/displays adequate comfort level or baseline comfort level  11/13/2024 0823 by Eddie Bajwa, RN  Outcome: Progressing  11/13/2024 0319 by Julissa Mesa RN  Outcome: Progressing  11/12/2024 1847 by Mariana Briceño RN  Outcome: Progressing     Problem: Genitourinary - Adult  Goal: Absence of urinary retention  11/13/2024 0823 by Eddie Bajwa, RN  Outcome: Progressing  11/13/2024 0319 by Julissa Mesa RN  Outcome: Progressing  11/12/2024 1847 by Mariana Briceño RN  Outcome: Progressing

## 2024-11-13 NOTE — PLAN OF CARE
Problem: Safety - Adult  Goal: Free from fall injury  11/13/2024 1033 by Eddie Bajwa, RN  Outcome: Completed  11/13/2024 0823 by Eddie Bajwa, RN  Outcome: Progressing  11/13/2024 0319 by Julissa Mesa RN  Outcome: Progressing     Problem: Pain  Goal: Verbalizes/displays adequate comfort level or baseline comfort level  11/13/2024 1033 by Eddie Bajwa, RN  Outcome: Completed  11/13/2024 0823 by Eddie Bajwa, RN  Outcome: Progressing  11/13/2024 0319 by Julissa Mesa RN  Outcome: Progressing     Problem: Genitourinary - Adult  Goal: Absence of urinary retention  11/13/2024 1033 by Eddie Bajwa, RN  Outcome: Completed  11/13/2024 0823 by Eddie Bajwa, RN  Outcome: Progressing  11/13/2024 0319 by Julissa Mesa RN  Outcome: Progressing

## 2024-11-13 NOTE — PROGRESS NOTES
Neal Shaw Santacroce, Khan, Mostafa, Buck, Murtagh   Urology Progress Note     Chief Complaint: Pyelonephritis, possible hydronephrosis possible abscess    Subjective:  Overnight patient did spike a fever.  Also was slightly tachycardic.  Pain level: Minimal  Denies chills, nausea, vomiting, chest pain, shortness of breath    Pain minimal    UOP: Not charted,  WBC: None ordered  Hgb: Not ordered  Cr: Not ordered    Patient Vitals for the past 24 hrs:   BP Temp Temp src Pulse Resp SpO2   11/12/24 0746 137/83 98.7 °F (37.1 °C) Oral 86 18 100 %   11/12/24 0235 (!) 144/83 99 °F (37.2 °C) Oral 95 16 98 %   11/11/24 1936 114/80 99 °F (37.2 °C) Oral 90 18 97 %   11/11/24 1723 -- (!) 100.7 °F (38.2 °C) Oral -- -- --       Intake/Output Summary (Last 24 hours) at 11/12/2024 0810  Last data filed at 11/12/2024 0526  Gross per 24 hour   Intake 1752.19 ml   Output 1 ml   Net 1751.19 ml       Recent Labs     11/11/24  0939   WBC 13.4*   HGB 10.8*   HCT 34.0*   MCV 70.8*        Recent Labs     11/11/24  0245   *   K 3.7      CO2 22   BUN 7   CREATININE 0.7       Recent Labs     11/10/24  2054   COLORU Dark Yellow*   PHUR 5.5   WBCUA 10 TO 20   RBCUA 2 TO 5   BACTERIA None   LEUKOCYTESUR TRACE*   UROBILINOGEN Normal   BILIRUBINUR NEGATIVE       Interval Imaging Findings:  HISTORY:  ORDERING SYSTEM PROVIDED HISTORY: concern for abdominal infection including  kidney and GB. elevated lfts, +UA. concern for coesidting pathology  TECHNOLOGIST PROVIDED HISTORY:  concern for abdominal infection including kidney and GB. elevated lfts, +UA.  concern for coesidting pathology     Reason for Exam: concern for abdominal infection including kidney and GB.  elevated lfts, +UA. concern for coesidti...     FINDINGS:  LOWER CHEST: Mild septal thickening in the lung bases, right greater than  left.  Trace right pleural effusion.     ORGANS: Lack of intravenous contrast limits evaluation of the solid organs  and bowel. The 
CLINICAL PHARMACY NOTE: MEDS TO BEDS    Total # of Prescriptions Filled: 3   The following medications were delivered to the patient:  CIPRO 500MG  TYLENOL 500MG  OXY 5MG     Additional Documentation:   
[facility] - Daily Progress Note  Today's Date and Time: 11/13/2024, 8:49 AM    Impression :   Complicated ESBL UTI , pyelonephritis  Patient febrile with CVA tenderness and tachycardia on admission  WBC resolved at 9.2  Culture taken here on 11-10-24 showed no growth at 3 days, spoke with [facility] ER who confirmed patient had positive ESBL E coli urine culture on 11-8-24, sensitive to ciprofloxacin, cefoxitin, and levofloxacin  CT Abd/Pel without contrast shows area of attenuation consistent w/ pyelo or abscess  Renal US confirmed areas of attenuation consistent with pyelonephritis  Thalassemia  No history of transfusions  Symptoms less likely related to current condition    Recommendations:   Complicated ESBL E coli UTI with pyelonephritis  On IV meropenem, start date 11/11/24, switch to ciprofloxacin 500mg PO BID x 26 days on discharge  Per [facility] ER, ESBL culture on 11-8-24 was sensitive to ciprofloxacin, levofloxacin, and cefoxitin  Patient is ready for discharge from ID standpoint   Office f/up in 4 weeks with Dr Hernandez for infection. Please call [phone] for appointment     Medical Decision Making/Summary/Discussion:11/13/2024       Infection Control Recommendations   [place] Precautions    Antimicrobial Stewardship Recommendations     Simplification of therapy  Targeted therapy    Coordination of Outpatient Care:   Estimated Length of IV antimicrobials:TBD  Patient will need Midline Catheter Insertion: TBD  Patient will need PICC line Insertion:TBD  Patient will need: Home IV , Infusion Center,  SNF,  LTAC: TBD  Patient will need outpatient wound care: no    Chief complaint/reason for consultation:   Pyelonephritis, ESBL E coli+      History of Present Illness:   Evelyne Cruz is a 54 y.o.-year-old female who was initially admitted on 11/10/2024. Patient seen at the request of Dr. Carmichael    INITIAL HISTORY:  Patient with PMH of thalassemia and 
[facility] - Daily Progress Note  Today's Date and Time: 11/12/2024, 7:46 AM    Impression :   Complicated ESBL UTI , pyelonephritis  Patient febrile with CVA tenderness and tachycardia on admission  WBC resolving from 13.4 to 10.6  Cultures pending, spoke with [facility] ER who confirmed patient had positive ESBL E coli urine culture on 11-8-24, sensitive to ciprofloxacin, cefoxitin, and levofloxacin  CT Abd/Pel without contrast shows area of attenuation consistent w/ pyelo or abscess, need follow up CT with contrast  Thalassemia  No history of transfusions  Symptoms less likely related to current condition    Recommendations:   Complicated ESBL E coli UTI with pyelonephritis  On IV meropenem, start date 11/11/24, switch to ciprofloxacin 500mg PO BID x 26 days on discharge  Per [facility] ER, ESBL culture on 11-8-24 was sensitive to ciprofloxacin, levofloxacin, and cefoxitin  Leucocytosis, resolving  Blood cultures no growth at 1 day    Medical Decision Making/Summary/Discussion:11/12/2024       Infection Control Recommendations   [place] Precautions    Antimicrobial Stewardship Recommendations     Simplification of therapy  Targeted therapy    Coordination of Outpatient Care:   Estimated Length of IV antimicrobials:TBD  Patient will need Midline Catheter Insertion: TBD  Patient will need PICC line Insertion:TBD  Patient will need: Home IV , Infusion Center,  SNF,  LTAC: TBD  Patient will need outpatient wound care: no    Chief complaint/reason for consultation:   Pyelonephritis, ESBL E coli+      History of Present Illness:   Evelyne Cruz is a 54 y.o.-year-old female who was initially admitted on 11/10/2024. Patient seen at the request of Dr. Carmichael    INITIAL HISTORY:  Patient with PMH of thalassemia and prior kidney stones s/p lithotripsy 30 years prior who is admitted for possible pyelonephritis.    She has been having 10 days of dysuria and right sided 
[facility]  Office: [phone]  Keith Hollingsworth DO, Anoop Granados DO, Howard Gann DO, Khanh Uribe DO, Gloria Candelario MD, Bethany Malave MD, Paula León MD, Lexi Marino MD,  William Vega MD, Claudia Martins MD, Khloe Barajas MD,  Una Resendiz DO, Lizzeth Krishnamurthy MD, Abundio Francois MD, Cain Hollingsworth DO, Kathi Paez MD,  Darren Green DO, Mirella Banda MD, Amparo Recinos MD, Emma Vu MD, Irais Lucio MD,  Fab Colmenares MD, Demetrio Odonnell MD, Mahogany Morataya MD, Jennifer Strickland MD, Baldomero Crowder MD, Sidney Smith MD, Eddie Duncan DO, Wilmer Valentine MD, Shirley Waterhouse, CNP,  Yolanda Tejada, CNP, Eddie Lepe, BRYAN,  Loli Sparks, CHRISTI, Michaela Spencer, CNP, Deanna Morales, CNP, Tawny León, CNP, Sherley Gardner, CNP, CHRISTIANE MaresC, CHRISTIANE HernándezC, Sultana Valenzuela, CNP, Day Glover, CNP,  Felisa Rodrigues, CNP, Aimee Chaves, CNP,  Carolina Steele, CNP, Marielena Castellanos, CNP         [facility]   IN-PATIENT SERVICE   [facility]    Progress Note    11/11/2024    8:47 AM    Name:   Evelyne Joe  MRN:     [id number]     Acct:      [id number]   Room:   [id number]   Day:  1  Admit Date:  11/10/2024  7:43 PM    PCP:   Jamal Calderon MD  Code Status:  Full Code    Subjective:     C/C: No chief complaint on file.    Interval History Status: not changed.    Obtain ct given mixed GI/ picture  Cbc ordered stat  Urine cultures pending  Lactic, neuro cehcks, serial abdominal exams, urine output,     She is stable. Resting, nontoxic appearing ,AO3.    No  flank pain, abdomen non tender, she says today is first day she felt much better    We called the urgent care she went to , the cultures have not resulted.  CT  scan from outside facility doesn't comment on liver, GB.    She is not jaundiced. She is Beta thal minor and has never had any hemolysis/transfusion.     She reports her symptoms started suddenly the night of halloweeen. 
[facility]  Office: [phone]  Keith Hollingsworth DO, Anoop Granados DO, Howard Gann DO, Khanh Uribe DO, Gloria Candelario MD, Bethany Malave MD, Paula León MD, Lexi Marino MD,  William Vega MD, Claudia Martins MD, Khloe Barajas MD,  Una Resendiz DO, Lizzeth Krishnamurthy MD, Abundio Francois MD, Cain Hollingsworth DO, Kathi Paez MD,  Darren Green DO, Mirella Banda MD, Amparo Recinos MD, Emma Vu MD, Irais Lucio MD,  Fab Colmenares MD, Demetrio Odonnell MD, Mahogany Morataya MD, Jennifer Strickland MD, Baldomero Crowder MD, Sidney Smith MD, Eddie Duncan DO, Wilmer Valentine MD, Shirley Waterhouse, CNP,  Yolanda Tejada, CNP, Eddie Lepe, CNP,  Loli Sparks, CHRISTI, Michaela Spencer, CNP, Deanna Morales, CNP, Tawny León, CNP, Sherley Gardner, CNP, Mariia Iverson PANahomiC, CHRISTIANE HernándezC, Sultana Valenzuela, CNP, Day Glover, CNP,  Felisa Rodrigues, CNP, Aimee Chaves, CNP,  Carolina Steele, CNP, Marielena Castellanos, CNP         [facility]   IN-PATIENT SERVICE   [facility]    Progress Note    11/12/2024    10:27 AM    Name:   Evelyne Cruz  MRN:     [id number]     Acct:      [id number]   Room:   0345/0345-02   Day:  2  Admit Date:  11/10/2024  7:43 PM    PCP:   Jamal Calderon MD  Code Status:  Full Code    Subjective:     C/C: No chief complaint on file.    Interval History Status: significantly improved.     Patient was seen and examined at time of my evaluation patient was seen resting in her bed she denies any complaint, denies any pain or burning with urination reports mild right flank pain with no CVA tenderness on exam  Continue to receive IV antibiotic, urology and ID on board appreciate input  Lab vital sign consultant note reviewed    Brief History:     Per my partner :  Evelyne Joe is a 54 y.o. [place]  female pmh Thalemessia- asymptomatic  (no prior blood transfusions) and right kidney stone ~30 years s/p lithotripsy who presented to [facility] 
and oriented to person place and time.  HEENT: No acute abnormalities  Lungs: Respiratory effort normal on room air  Cardiovascular:  Heart rate regular rate and rhythm  Abdomen: Soft, non-tender, non-distended. Non peritonitic  Extremities: No leg swelling, no edema  : Urinating relatively difficulty      Impression:  Patient is a 54-year-old female presented hospital due to ESBL E. coli.  Found to have pyelonephritis.  Urology was consulted for mild hydronephrosis.  Patient continued to spike fevers and had reimaging.  The hydronephrosis has resolved itself however imaging indicates a possible new abscess formation/forming abscess.  Patient should continue be monitored with IV antibiotics and vital signs.  If can patient continues to spike fevers, would recommend reimaging tomorrow with IV contrast CT scan or MRI    Plan:  Continue IV antibiotics-will defer to infectious disease for antimicrobial management following discharge  No further intervention from urological standpoint  No concern for abscess at this time, patient is responding to antibiotics and improving  We will have patient follow-up outpatient for history of kidney stones and for possible repeat imaging following discharge    Thank you for allowing us to care for this patient.  Please feel free to reach out with any further questions or concerns.    Fang Cooley DO  Urology Resident, PGY-5

## 2024-11-16 LAB
MICROORGANISM SPEC CULT: NORMAL
MICROORGANISM SPEC CULT: NORMAL
SERVICE CMNT-IMP: NORMAL
SERVICE CMNT-IMP: NORMAL
SPECIMEN DESCRIPTION: NORMAL
SPECIMEN DESCRIPTION: NORMAL

## 2024-12-02 ENCOUNTER — OFFICE VISIT (OUTPATIENT)
Dept: UROLOGY | Age: 54
End: 2024-12-02
Payer: COMMERCIAL

## 2024-12-02 VITALS
OXYGEN SATURATION: 99 % | WEIGHT: 101 LBS | TEMPERATURE: 97.4 F | SYSTOLIC BLOOD PRESSURE: 126 MMHG | BODY MASS INDEX: 18.58 KG/M2 | HEIGHT: 62 IN | DIASTOLIC BLOOD PRESSURE: 78 MMHG | HEART RATE: 69 BPM

## 2024-12-02 DIAGNOSIS — N13.30 HYDRONEPHROSIS OF RIGHT KIDNEY: Primary | ICD-10-CM

## 2024-12-02 DIAGNOSIS — N30.00 ACUTE CYSTITIS WITHOUT HEMATURIA: ICD-10-CM

## 2024-12-02 PROCEDURE — G8419 CALC BMI OUT NRM PARAM NOF/U: HCPCS | Performed by: UROLOGY

## 2024-12-02 PROCEDURE — 99204 OFFICE O/P NEW MOD 45 MIN: CPT | Performed by: UROLOGY

## 2024-12-02 PROCEDURE — G8484 FLU IMMUNIZE NO ADMIN: HCPCS | Performed by: UROLOGY

## 2024-12-02 PROCEDURE — G8427 DOCREV CUR MEDS BY ELIG CLIN: HCPCS | Performed by: UROLOGY

## 2024-12-02 PROCEDURE — 3017F COLORECTAL CA SCREEN DOC REV: CPT | Performed by: UROLOGY

## 2024-12-02 PROCEDURE — 1036F TOBACCO NON-USER: CPT | Performed by: UROLOGY

## 2024-12-02 PROCEDURE — 1111F DSCHRG MED/CURRENT MED MERGE: CPT | Performed by: UROLOGY

## 2024-12-02 RX ORDER — FLUCONAZOLE 100 MG/1
100 TABLET ORAL DAILY
Qty: 3 TABLET | Refills: 0 | Status: SHIPPED | OUTPATIENT
Start: 2024-12-02

## 2024-12-02 ASSESSMENT — ENCOUNTER SYMPTOMS
CONSTIPATION: 0
EYE PAIN: 0
SHORTNESS OF BREATH: 0
EYE REDNESS: 0
WHEEZING: 0
COUGH: 0
ABDOMINAL PAIN: 0
DIARRHEA: 0
NAUSEA: 0
BACK PAIN: 0
VOMITING: 0

## 2024-12-02 NOTE — PROGRESS NOTES
Review of Systems   Constitutional:  Negative for chills, fatigue and fever.   Eyes:  Negative for pain, redness and visual disturbance.   Respiratory:  Negative for cough, shortness of breath and wheezing.    Cardiovascular:  Negative for chest pain and leg swelling.   Gastrointestinal:  Negative for abdominal pain, constipation, diarrhea, nausea and vomiting.   Genitourinary:  Negative for difficulty urinating, dysuria, flank pain, frequency, hematuria and urgency.   Musculoskeletal:  Negative for back pain, joint swelling and myalgias.   Skin:  Negative for rash and wound.   Neurological:  Negative for dizziness, tremors, weakness and numbness.   Hematological:  Does not bruise/bleed easily.     
Order Specific Question:   Reason for exam:     Answer:   pyelonephritis            Jimmy De Jesus MD    Agree with the ROS entered by the MA.

## 2025-02-17 ENCOUNTER — HOSPITAL ENCOUNTER (OUTPATIENT)
Dept: ULTRASOUND IMAGING | Age: 55
Discharge: HOME | End: 2025-02-19
Attending: UROLOGY
Payer: COMMERCIAL

## 2025-02-17 DIAGNOSIS — N30.00 ACUTE CYSTITIS WITHOUT HEMATURIA: ICD-10-CM

## 2025-02-17 DIAGNOSIS — N13.30 HYDRONEPHROSIS OF RIGHT KIDNEY: ICD-10-CM

## 2025-02-17 PROCEDURE — 76775 US EXAM ABDO BACK WALL LIM: CPT

## 2025-02-24 ENCOUNTER — OFFICE VISIT (OUTPATIENT)
Dept: UROLOGY | Age: 55
End: 2025-02-24
Payer: COMMERCIAL

## 2025-02-24 VITALS
DIASTOLIC BLOOD PRESSURE: 84 MMHG | HEIGHT: 62 IN | BODY MASS INDEX: 18.77 KG/M2 | WEIGHT: 102 LBS | TEMPERATURE: 97 F | HEART RATE: 72 BPM | SYSTOLIC BLOOD PRESSURE: 110 MMHG | OXYGEN SATURATION: 99 %

## 2025-02-24 DIAGNOSIS — N13.30 HYDRONEPHROSIS OF RIGHT KIDNEY: Primary | ICD-10-CM

## 2025-02-24 PROCEDURE — 3017F COLORECTAL CA SCREEN DOC REV: CPT | Performed by: UROLOGY

## 2025-02-24 PROCEDURE — G8427 DOCREV CUR MEDS BY ELIG CLIN: HCPCS | Performed by: UROLOGY

## 2025-02-24 PROCEDURE — 99212 OFFICE O/P EST SF 10 MIN: CPT | Performed by: UROLOGY

## 2025-02-24 PROCEDURE — 1036F TOBACCO NON-USER: CPT | Performed by: UROLOGY

## 2025-02-24 PROCEDURE — G8420 CALC BMI NORM PARAMETERS: HCPCS | Performed by: UROLOGY

## 2025-02-24 ASSESSMENT — ENCOUNTER SYMPTOMS
VOMITING: 0
BACK PAIN: 0
NAUSEA: 0
EYE REDNESS: 0
WHEEZING: 0
EYE PAIN: 0
ABDOMINAL PAIN: 0
SHORTNESS OF BREATH: 0
DIARRHEA: 0
COUGH: 0
CONSTIPATION: 0

## 2025-02-24 NOTE — PROGRESS NOTES
[facility]YS[facility]ANS [facility]OLOGY [facility]  [address]  [facility] [address]  Dept: [phone]    [facility] Urology Office Note - Established    Patient:  Evelyne Cruz  YOB: 1970  Date: 2025    The patient is a 54 y.o. female whopresents today for evaluation of the following problems:   Chief Complaint   Patient presents with    Other     3 month US ()       HPI  This is a very pleasant 54-year-old female who was admitted with pyelonephritis last fall.  She had a recent renal ultrasound which showed no residual hydronephrosis.      Summary of old records: N/A    Additional History: N/A    Procedures Today: N/A    Urinalysis today:  No results found for this visit on 25.    Imaging Reviewed during this Office Visit: none  (results were independently reviewed by physician and radiology report verified)    AUA Symptom Score (2025):                               Last BUN and creatinine:  Lab Results   Component Value Date    BUN 8 2024     Lab Results   Component Value Date    CREATININE 0.6 2024       Additional Lab/Culture results: none    PAST MEDICAL, FAMILY AND SOCIAL HISTORY UPDATE:  Past Medical History:   Diagnosis Date    Kidney stone on right side     ~30 years prior requiring lithotripsy    Thalassemia      Past Surgical History:   Procedure Laterality Date     SECTION      LITHOTRIPSY Right     ~30 years ago     Family History   Problem Relation Age of Onset    Stroke Mother     Cancer Father         tongue cancer     No outpatient medications have been marked as taking for the 25 encounter (Office Visit) with Jimmy De Jesus MD.      (All medications reviewed and updated by provider sincelast office visit or hospitalization)   Patient has no known allergies.  Social History     Tobacco Use   Smoking Status Never   Smokeless Tobacco Never      (If patient a smoker, smoking